# Patient Record
Sex: FEMALE | Race: WHITE
[De-identification: names, ages, dates, MRNs, and addresses within clinical notes are randomized per-mention and may not be internally consistent; named-entity substitution may affect disease eponyms.]

---

## 2019-07-30 ENCOUNTER — HOSPITAL ENCOUNTER (EMERGENCY)
Dept: HOSPITAL 11 - JP.ED | Age: 29
Discharge: HOME | End: 2019-07-30
Payer: MEDICAID

## 2019-07-30 DIAGNOSIS — R55: ICD-10-CM
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
## 2019-07-30 DIAGNOSIS — S09.90XA: Primary | ICD-10-CM

## 2019-07-30 NOTE — CRLCT
INDICATION: Fall, jaw face pain



TECHNIQUE: CT maxillofacial without i.v. contrast. Coronal and sagittal 

reformats were obtained.



COMPARISON: None



FINDINGS: 



Bone: No acute fractures or aggressive bone lesions are identified. 



Joint: The temporomandibular joints are unremarkable in appearance. 



Sinus: Moderate mucosal thickening seen in the left maxillary sinus. The 

remainder the paranasal sinuses are well aerated. The ostiomeatal units are 

patent. The nasal turbinates are normal. The nasal septum is midline and 

intact. 



Orbit: The visualized orbits are grossly unremarkable. 



Soft tissue: Unremarkable. 



IMPRESSION: 



1. No acute osseous injuries or abnormalities are seen.



Dictated by Nabil Kaur MD @ 7/30/2019 9:48:51 PM



Please note that all CT scans at this facility use dose modulation, 

iterative reconstruction, and/or weight-based dosing when appropriate to 

reduce radiation dose to as low as reasonably achievable.



Dictated by: Nabil Kaur MD @ 07/30/2019 21:48:57



(Electronically Signed)

## 2019-07-30 NOTE — EDM.PDOC
ED HPI GENERAL MEDICAL PROBLEM





- General


Chief Complaint: Head Injury


Stated Complaint: PASSED OUT,FELL DOWN STAIRS


Time Seen by Provider: 19 20:19


Source of Information: Reports: Patient, Family, RN Notes Reviewed


History Limitations: Reports: No Limitations





- History of Present Illness


INITIAL COMMENTS - FREE TEXT/NARRATIVE: 





29-year-old female presents emergency department today complaint of syncopal 

event, she was seated at the stop the stairs, stood up and felt lightheaded and 

fell down the stairs landing on her chin. Witnesses say following this event 

she had shaking both upper and lower extremities was confused for a short time 

or regaining awareness. At this time she complains of jaw and having a 

headache. She has no family history no personal history of seizure like activity


  ** headache


Pain Score (Numeric/FACES): 3





- Related Data


 Allergies











Allergy/AdvReac Type Severity Reaction Status Date / Time


 


No Known Allergies Allergy   Verified 19 20:02











Home Meds: 


 Home Meds





NK [No Known Home Meds]  19 [History]











Past Medical History


HEENT History: Reports: Impaired Vision


Cardiovascular History: Reports: Syncope


OB/GYN History: Reports: Pregnancy





- Infectious Disease History


Infectious Disease History: Reports: Chicken Pox





- Past Surgical History


HEENT Surgical History: Reports: Oral Surgery


GI Surgical History: Reports: Cholecystectomy


Female  Surgical History: Reports:  Section





Social & Family History





- Tobacco Use


Smoking Status *Q: Never Smoker





- Caffeine Use


Caffeine Use: Reports: Coffee, Soda





- Recreational Drug Use


Recreational Drug Use: No





ED ROS GENERAL





- Review of Systems


Review Of Systems: See Below


Constitutional: Reports: No Symptoms


HEENT: Reports: No Symptoms


Respiratory: Reports: No Symptoms


Cardiovascular: Reports: Syncope


GI/Abdominal: Reports: No Symptoms


Musculoskeletal: Reports: No Symptoms


Skin: Reports: No Symptoms


Neurological: Reports: Headache





ED EXAM, HEAD INJURY





- Physical Exam


Exam: See Below


Text/Narrative:: 





General: Female, not in any distress, alert and oriented x3 HEENT: head is 

slight erythema is appreciated on the right side of the chin normocephalic, eyes

 pupils equal round reactive to light, sclera clear no conjunctivitis 

appreciated extraocular eye movements intact.  Ears tympanic membranes clear 

and gray landmarks and light reflex are present bilaterally canals are clear.  

Nose no septal deviation, nares are clear, no blood present.  Mouth mucosa is 

moist and pink no erythema or exudate noted in soft palate, tongue is midline 

uvula is midline, dentition is intact.


Neck: Supple no thyromegaly no tracheal deviation.


Nodes: Cervical nodes subclavicular nodes nontender no palpable lymphadenopathy 

noted.





NO posterior midline C-spine tenderness


NO evidence of intoxication


GCS > 14 


No focal neurological deficit


NO distracting injury


Lungs: clear to auscultation bilaterally with symmetrical respirations, no 

adventitious noise appreciated.


CV: Regular rate and rhythm S1 and S2 appreciated no murmurs rubs or gallops 

noted.


Abdomen: Soft, nontender, no palpable masses or organomegaly appreciated, no 

distention no guarding bowel sounds are present, .


Neuro: Cranial nerves II test with pupillary light reflex 4 mm to 2 mm 

bilaterally, CN III test pupillary constriction, limited elevation and eye 

abduction bilaterally, CN IV downward movement of eyes bilaterally, CN V good 

jaw movement, CN VI lateral deviation of the eyes bilaterally to finger movement

, CN VII symmetrical smile shows teeth without difficulty, CN VIII pass finger 

rub to ears bilaterally, CN IX adequate voice and tone, CN X adequate voice and 

tone no difficulty swallowing, CN XI can shrug shoulders without difficulty, CN 

XII can stick tongue out without difficulty, cranial nerves  II to XII intact 

as tested,


Skin: Warm and dry, intact


Extremities: No lower extremity edema appreciated, .





Course





- Vital Signs


Last Recorded V/S: 


 Last Vital Signs











Temp  96.7 F   19 20:08


 


Pulse  77   19 20:08


 


Resp  15   19 20:08


 


BP  134/70   19 20:08


 


Pulse Ox  98   19 20:08














- Orders/Labs/Meds


Orders: 


 Active Orders 24 hr











 Category Date Time Status


 


 EKG Documentation Completion [RC] ASDIRECTED Care  19 20:26 Active


 


 EKG 12 Lead [EK] Stat Ther  19 20:25 Ordered














Departure





- Departure


Time of Disposition: 23:02


Disposition: Home, Self-Care 01


Condition: Fair


Clinical Impression: 


Head injury


Qualifiers:


 Encounter type: initial encounter Qualified Code(s): S09.90XA - Unspecified 

injury of head, initial encounter








- Discharge Information


Referrals: 


PCP,None [Primary Care Provider] - 


Forms:  ED Department Discharge


Additional Instructions: 


Use Tylenol or Motrin as needed for pain control,  Please followup with your 

primary care provider in 3-5 days if not better, please call return to the 

emergency department with worsening of symptoms.





- My Orders


Last 24 Hours: 


My Active Orders





19 20:25


EKG 12 Lead [EK] Stat 





19 20:26


EKG Documentation Completion [RC] ASDIRECTED 














- Assessment/Plan


Last 24 Hours: 


My Active Orders





19 20:25


EKG 12 Lead [EK] Stat 





19 20:26


EKG Documentation Completion [RC] ASDIRECTED 











Plan: 





Assessment





Acuity = acute





Site and laterality = head injury with syncopal event  





Etiology  = unknown etiology  





Manifestations = none  





Location of injury =  Home





Lab values = CT scan and maxillofacial bones and head negative for any acute 

process





Plan


I did review films with her she is, follow-up with her primary care in the next 

5-7 days for reevaluation 

















 This note was dictated using dragon voice recognition software please call 

with any questions on syntax or grammar.

## 2019-07-30 NOTE — CRLCT
INDICATION: Head injury from trauma, fall, syncope



TECHNIQUE: CT Head without i.v. contrast.



COMPARISON: None



FINDINGS: 



CSF space: The ventricles are normal for age. 



Brain: No evidence of mass, acute infarction or hemorrhage is seen. No 

mass-effect or midline shift is seen. The brain parenchyma is otherwise 

normal in appearance with preservation of the gray-white matter junction. 



Calvarium: The visualized paranasal sinuses are well aerated. The mastoid 

air cells are clear. The visualized orbits are grossly unremarkable. The 

calvarium is unremarkable in appearance with no fractures identified. 



IMPRESSION: 



1. No evidence of acute infarction, intracranial hemorrhage, or mass-effect 

seen. 



Please note that all CT scans at this facility use dose modulation, 

iterative reconstruction, and/or weight-based dosing when appropriate to 

reduce radiation dose to as low as reasonably achievable.



Dictated by: Nabil Kaur MD @ 07/30/2019 21:45:38



(Electronically Signed)

## 2025-06-01 ENCOUNTER — HOSPITAL ENCOUNTER (EMERGENCY)
Dept: HOSPITAL 11 - JP.ED | Age: 35
Discharge: HOME | End: 2025-06-01
Payer: MEDICAID

## 2025-06-01 DIAGNOSIS — H60.502: Primary | ICD-10-CM

## 2025-06-01 DIAGNOSIS — Z90.49: ICD-10-CM
